# Patient Record
Sex: MALE | Race: OTHER | HISPANIC OR LATINO | URBAN - METROPOLITAN AREA
[De-identification: names, ages, dates, MRNs, and addresses within clinical notes are randomized per-mention and may not be internally consistent; named-entity substitution may affect disease eponyms.]

---

## 2021-08-29 ENCOUNTER — EMERGENCY (EMERGENCY)
Facility: HOSPITAL | Age: 41
LOS: 1 days | Discharge: HOME | End: 2021-08-29
Attending: EMERGENCY MEDICINE
Payer: COMMERCIAL

## 2021-08-29 VITALS
SYSTOLIC BLOOD PRESSURE: 145 MMHG | DIASTOLIC BLOOD PRESSURE: 77 MMHG | OXYGEN SATURATION: 98 % | WEIGHT: 149.91 LBS | TEMPERATURE: 98 F | HEART RATE: 75 BPM | RESPIRATION RATE: 16 BRPM | HEIGHT: 64 IN

## 2021-08-29 PROCEDURE — 99284 EMERGENCY DEPT VISIT MOD MDM: CPT

## 2021-08-29 PROCEDURE — 73080 X-RAY EXAM OF ELBOW: CPT | Mod: 26,RT

## 2021-08-29 PROCEDURE — 73080 X-RAY EXAM OF ELBOW: CPT

## 2021-08-29 PROCEDURE — 73564 X-RAY EXAM KNEE 4 OR MORE: CPT | Mod: 26,LT

## 2021-08-29 PROCEDURE — 99284 EMERGENCY DEPT VISIT MOD MDM: CPT | Mod: 25

## 2021-08-29 PROCEDURE — 73564 X-RAY EXAM KNEE 4 OR MORE: CPT

## 2021-08-29 PROCEDURE — 93971 EXTREMITY STUDY: CPT

## 2021-08-29 PROCEDURE — 93971 EXTREMITY STUDY: CPT | Mod: 26,LT

## 2021-08-29 RX ORDER — IBUPROFEN 200 MG
600 TABLET ORAL ONCE
Refills: 0 | Status: COMPLETED | OUTPATIENT
Start: 2021-08-29 | End: 2021-08-29

## 2021-08-29 RX ORDER — IBUPROFEN 200 MG
1 TABLET ORAL
Qty: 9 | Refills: 0
Start: 2021-08-29 | End: 2021-08-31

## 2021-08-29 RX ORDER — METHOCARBAMOL 500 MG/1
2 TABLET, FILM COATED ORAL
Qty: 18 | Refills: 0
Start: 2021-08-29 | End: 2021-08-31

## 2021-08-29 RX ORDER — DEXAMETHASONE 0.5 MG/5ML
10 ELIXIR ORAL ONCE
Refills: 0 | Status: COMPLETED | OUTPATIENT
Start: 2021-08-29 | End: 2021-08-29

## 2021-08-29 RX ADMIN — Medication 10 MILLIGRAM(S): at 21:45

## 2021-08-29 RX ADMIN — Medication 600 MILLIGRAM(S): at 20:28

## 2021-08-29 NOTE — ED ADULT TRIAGE NOTE - CHIEF COMPLAINT QUOTE
pt c/o left knee poping and pain walking with limp pt c/o right elbow pain for month without injury.

## 2021-08-29 NOTE — ED PROVIDER NOTE - NSFOLLOWUPINSTRUCTIONS_ED_ALL_ED_FT
- Please follow up with your Primary Care Doctor in 1 - 2 days. If you cannot follow-up with your primary care doctor please return to the Emergency Department for any urgent issues.  - Seek immediate medical care for any new, worsening or concerning signs or symptoms.   - Take medications as directed, be sure to read all instructions on packaging  - You were given copies of all your test results, please bring to your primary care doctor for review of any abnormal test results  - Follow up with Orthopedist or Follow up with Orthopedic [facility] Team by calling [phone] or emailing [email or web address] to make an appointment with an Orthopedist.   - If you have difficulty following up, please call: [phone] or go to [email or web address] to obtain a [facility] doctor or specialist who takes your insurance in your area.    Feel better!    Knee Pain    WHAT YOU NEED TO KNOW:    Knee pain may start suddenly, or it may be a long-term problem. You may have pain on the side, front, or back of your knee. You may have knee stiffness and swelling. You may hear popping sounds or feel like your knee is giving way or locking up as you walk. You may feel pain when you sit, stand, walk, or climb up and down stairs. Knee pain can be caused by conditions such as obesity, inflammation, or strains or tears in ligaments or tendons.     DISCHARGE INSTRUCTIONS:    Return to the emergency department if:   •Your pain is worse, even after treatment.       •You cannot bend or straighten your leg completely.       •The swelling around your knee does not go down even with treatment.      •Your knee is painful and hot to the touch.       Contact your healthcare provider if:   •You have questions or concerns about your condition or care.           Medicines: You may need any of the following:   •NSAIDs help decrease swelling and pain or fever. This medicine is available with or without a doctor's order. NSAIDs can cause stomach bleeding or kidney problems in certain people. If you take blood thinner medicine, always ask your healthcare provider if NSAIDs are safe for you. Always read the medicine label and follow directions.      •Acetaminophen decreases pain and fever. It is available without a doctor's order. Ask how much to take and how often to take it. Follow directions. Read the labels of all other medicines you are using to see if they also contain acetaminophen, or ask your doctor or pharmacist. Acetaminophen can cause liver damage if not taken correctly. Do not use more than 4 grams (4,000 milligrams) total of acetaminophen in one day.       •Prescription pain medicine may be given. Ask your healthcare provider how to take this medicine safely. Some prescription pain medicines contain acetaminophen. Do not take other medicines that contain acetaminophen without talking to your healthcare provider. Too much acetaminophen may cause liver damage. Prescription pain medicine may cause constipation. Ask your healthcare provider how to prevent or treat constipation.       •Take your medicine as directed. Contact your healthcare provider if you think your medicine is not helping or if you have side effects. Tell him or her if you are allergic to any medicine. Keep a list of the medicines, vitamins, and herbs you take. Include the amounts, and when and why you take them. Bring the list or the pill bottles to follow-up visits. Carry your medicine list with you in case of an emergency.      What you can do to manage your symptoms:   •Rest your knee so it can heal. Limit activities that increase your pain. Do low-impact exercises, such as walking or swimming.       •Apply ice to help reduce swelling and pain. Use an ice pack, or put crushed ice in a plastic bag. Cover it with a towel before you apply it to your knee. Apply ice for 15 to 20 minutes every hour, or as directed.      •Apply compression to help reduce swelling. Use a brace or bandage only as directed.      •Elevate your knee to help decrease pain and swelling. Elevate your knee while you are sitting or lying down. Prop your leg on pillows to keep your knee above the level of your heart.      •Prevent your knee from moving as directed. Your healthcare provider may put on a cast or splint. You may need to wear a leg brace to stabilize your knee. A leg brace can be adjusted to increase your range of motion as your knee heals.  Hinged Knee Braces            What you can do to prevent knee pain:   •Maintain a healthy weight. Extra weight increases your risk for knee pain. Ask your healthcare provider how much you should weigh. He or she can help you create a safe weight loss plan if you need to lose weight.      •Exercise or train properly. Use the correct equipment for sports. Wear shoes that provide good support. Check your posture often as you exercise, play sports, or train for an event. This can help prevent stress and strain on your knees. Rest between sessions so you do not overwork your knees.      Follow up with your healthcare provider within 24 hours or as directed: You may need follow-up treatments, such as steroid injections to decrease pain. Write down your questions so you remember to ask them during your visits.  ---  Elbow Pain    What are the signs or symptoms?    •Pain and tenderness in your forearm and the outer part of your elbow. You may have pain all the time or only when you use your arm.      •A burning feeling. This starts in your elbow and spreads down your arm.      •A weak  in your hand.        How is this treated?  Resting and icing your arm is often the first treatment. Your doctor may also recommend:  •Medicines to reduce pain and swelling.      •An elbow strap.      •Physical therapy. This may include massage or exercises or both.      •An elbow brace.      If these do not help your symptoms get better, your doctor may recommend surgery.      Follow these instructions at home:    If you have a brace or strap:     •Wear the brace or strap as told by your doctor. Take it off only as told by your doctor.      •Check the skin around the brace or strap every day. Tell your doctor if you see problems.    •Loosen it if your fingers:  •Tingle.      •Become numb.      •Turn cold and blue.        •Keep the brace or strap clean.    •If the brace or strap is not waterproof:  •Do not let it get wet.      •Cover it with a watertight covering when you take a bath or a shower.          Managing pain, stiffness, and swelling    •If told, put ice on the injured area. To do this:  •If you have a removable brace or strap, take it off as told by your doctor.      •Put ice in a plastic bag.      •Place a towel between your skin and the bag.      •Leave the ice on for 20 minutes, 2–3 times a day.      •Take off the ice if your skin turns bright red. This is very important. If you cannot feel pain, heat, or cold, you have a greater risk of damage to the area.        •Move your fingers often.      Activity     •Rest your elbow and wrist. Avoid activities that can cause elbow problems as told by your doctor.      •Do exercises as told by your doctor.      •If you lift an object, lift it with your palm facing up.      Lifestyle   •If your tennis elbow is caused by sports, check your equipment and make sure that:  •You are using it the right way.      •It fits you well.        •If your tennis elbow is caused by work or computer use, take breaks often to stretch your arm. Talk with your manager about how you can make your condition better at work.      General instructions     •Take over-the-counter and prescription medicines only as told by your doctor.      • Do not smoke or use any products that contain nicotine or tobacco. If you need help quitting, ask your doctor.      •Keep all follow-up visits.        How is this prevented?  •Before and after being active:  •Warm up and stretch before being active.      •Cool down and stretch after being active.      •Give your body time to rest between activities.      •While being active:  •Make sure to use equipment that fits you.      •If you play tennis, put power in your stroke with your lower body. Avoid using your arm only.      •Maintain physical fitness. This includes:  •Strength.      •Flexibility.      •Endurance.        •Do exercises to strengthen the forearm muscles.        Contact a doctor if:    •Your pain does not get better with treatment.      •Your pain gets worse.      •You have weakness in your forearm, hand, or fingers.      •You cannot feel your forearm, hand, or fingers.        Get help right away if:    •Your pain is very bad.      •You cannot move your wrist.        Summary    •Tennis elbow is irritation and swelling (inflammation) in your outer forearm, near your elbow.      •Tennis elbow is caused by doing the same motion over and over.      •Rest your elbow and wrist. Avoid activities as told by your doctor.      •If told, put ice on the injured area for 20 minutes, 2–3 times a day.      This information is not intended to replace advice given to you by your health care provider. Make sure you discuss any questions you have with your health care provider. - Please follow up with your Primary Care Doctor in 1 - 2 days. If you cannot follow-up with your primary care doctor please return to the Emergency Department for any urgent issues.  - Seek immediate medical care for any new, worsening or concerning signs or symptoms.   - Take medications as directed, be sure to read all instructions on packaging  - You were given copies of all your test results, please bring to your primary care doctor for review of any abnormal test results  - Follow up with Orthopedist or Follow up with Orthopedic [facility] Team by calling [phone] or emailing [email or web address] to make an appointment with an Orthopedist.   - If you have difficulty following up, please call: [phone] or go to [email or web address] to obtain a [facility] doctor or specialist who takes your insurance in your area.  - Your blood pressure reading was high while in ED, please monitor your blood pressure at home and follow up with PMD.     Feel better!    Knee Pain    WHAT YOU NEED TO KNOW:    Knee pain may start suddenly, or it may be a long-term problem. You may have pain on the side, front, or back of your knee. You may have knee stiffness and swelling. You may hear popping sounds or feel like your knee is giving way or locking up as you walk. You may feel pain when you sit, stand, walk, or climb up and down stairs. Knee pain can be caused by conditions such as obesity, inflammation, or strains or tears in ligaments or tendons.     DISCHARGE INSTRUCTIONS:    Return to the emergency department if:   •Your pain is worse, even after treatment.       •You cannot bend or straighten your leg completely.       •The swelling around your knee does not go down even with treatment.      •Your knee is painful and hot to the touch.       Contact your healthcare provider if:   •You have questions or concerns about your condition or care.           Medicines: You may need any of the following:   •NSAIDs help decrease swelling and pain or fever. This medicine is available with or without a doctor's order. NSAIDs can cause stomach bleeding or kidney problems in certain people. If you take blood thinner medicine, always ask your healthcare provider if NSAIDs are safe for you. Always read the medicine label and follow directions.      •Acetaminophen decreases pain and fever. It is available without a doctor's order. Ask how much to take and how often to take it. Follow directions. Read the labels of all other medicines you are using to see if they also contain acetaminophen, or ask your doctor or pharmacist. Acetaminophen can cause liver damage if not taken correctly. Do not use more than 4 grams (4,000 milligrams) total of acetaminophen in one day.       •Prescription pain medicine may be given. Ask your healthcare provider how to take this medicine safely. Some prescription pain medicines contain acetaminophen. Do not take other medicines that contain acetaminophen without talking to your healthcare provider. Too much acetaminophen may cause liver damage. Prescription pain medicine may cause constipation. Ask your healthcare provider how to prevent or treat constipation.       •Take your medicine as directed. Contact your healthcare provider if you think your medicine is not helping or if you have side effects. Tell him or her if you are allergic to any medicine. Keep a list of the medicines, vitamins, and herbs you take. Include the amounts, and when and why you take them. Bring the list or the pill bottles to follow-up visits. Carry your medicine list with you in case of an emergency.      What you can do to manage your symptoms:   •Rest your knee so it can heal. Limit activities that increase your pain. Do low-impact exercises, such as walking or swimming.       •Apply ice to help reduce swelling and pain. Use an ice pack, or put crushed ice in a plastic bag. Cover it with a towel before you apply it to your knee. Apply ice for 15 to 20 minutes every hour, or as directed.      •Apply compression to help reduce swelling. Use a brace or bandage only as directed.      •Elevate your knee to help decrease pain and swelling. Elevate your knee while you are sitting or lying down. Prop your leg on pillows to keep your knee above the level of your heart.      •Prevent your knee from moving as directed. Your healthcare provider may put on a cast or splint. You may need to wear a leg brace to stabilize your knee. A leg brace can be adjusted to increase your range of motion as your knee heals.  Hinged Knee Braces            What you can do to prevent knee pain:   •Maintain a healthy weight. Extra weight increases your risk for knee pain. Ask your healthcare provider how much you should weigh. He or she can help you create a safe weight loss plan if you need to lose weight.      •Exercise or train properly. Use the correct equipment for sports. Wear shoes that provide good support. Check your posture often as you exercise, play sports, or train for an event. This can help prevent stress and strain on your knees. Rest between sessions so you do not overwork your knees.      Follow up with your healthcare provider within 24 hours or as directed: You may need follow-up treatments, such as steroid injections to decrease pain. Write down your questions so you remember to ask them during your visits.  ---  Elbow Pain    What are the signs or symptoms?    •Pain and tenderness in your forearm and the outer part of your elbow. You may have pain all the time or only when you use your arm.      •A burning feeling. This starts in your elbow and spreads down your arm.      •A weak  in your hand.        How is this treated?  Resting and icing your arm is often the first treatment. Your doctor may also recommend:  •Medicines to reduce pain and swelling.      •An elbow strap.      •Physical therapy. This may include massage or exercises or both.      •An elbow brace.      If these do not help your symptoms get better, your doctor may recommend surgery.      Follow these instructions at home:    If you have a brace or strap:     •Wear the brace or strap as told by your doctor. Take it off only as told by your doctor.      •Check the skin around the brace or strap every day. Tell your doctor if you see problems.    •Loosen it if your fingers:  •Tingle.      •Become numb.      •Turn cold and blue.        •Keep the brace or strap clean.    •If the brace or strap is not waterproof:  •Do not let it get wet.      •Cover it with a watertight covering when you take a bath or a shower.          Managing pain, stiffness, and swelling    •If told, put ice on the injured area. To do this:  •If you have a removable brace or strap, take it off as told by your doctor.      •Put ice in a plastic bag.      •Place a towel between your skin and the bag.      •Leave the ice on for 20 minutes, 2–3 times a day.      •Take off the ice if your skin turns bright red. This is very important. If you cannot feel pain, heat, or cold, you have a greater risk of damage to the area.        •Move your fingers often.      Activity     •Rest your elbow and wrist. Avoid activities that can cause elbow problems as told by your doctor.      •Do exercises as told by your doctor.      •If you lift an object, lift it with your palm facing up.      Lifestyle   •If your tennis elbow is caused by sports, check your equipment and make sure that:  •You are using it the right way.      •It fits you well.        •If your tennis elbow is caused by work or computer use, take breaks often to stretch your arm. Talk with your manager about how you can make your condition better at work.      General instructions     •Take over-the-counter and prescription medicines only as told by your doctor.      • Do not smoke or use any products that contain nicotine or tobacco. If you need help quitting, ask your doctor.      •Keep all follow-up visits.        How is this prevented?  •Before and after being active:  •Warm up and stretch before being active.      •Cool down and stretch after being active.      •Give your body time to rest between activities.      •While being active:  •Make sure to use equipment that fits you.      •If you play tennis, put power in your stroke with your lower body. Avoid using your arm only.      •Maintain physical fitness. This includes:  •Strength.      •Flexibility.      •Endurance.        •Do exercises to strengthen the forearm muscles.        Contact a doctor if:    •Your pain does not get better with treatment.      •Your pain gets worse.      •You have weakness in your forearm, hand, or fingers.      •You cannot feel your forearm, hand, or fingers.        Get help right away if:    •Your pain is very bad.      •You cannot move your wrist.        Summary    •Tennis elbow is irritation and swelling (inflammation) in your outer forearm, near your elbow.      •Tennis elbow is caused by doing the same motion over and over.      •Rest your elbow and wrist. Avoid activities as told by your doctor.      •If told, put ice on the injured area for 20 minutes, 2–3 times a day.      This information is not intended to replace advice given to you by your health care provider. Make sure you discuss any questions you have with your health care provider.

## 2021-08-29 NOTE — ED PROVIDER NOTE - CARE PROVIDER_API CALL
Lg Rodrigez (DO)  Orthopedics  [address] [place] [id number]  Phone: [phone]  Fax: [phone]  Follow Up Time: 4-6 Days

## 2021-08-29 NOTE — ED PROVIDER NOTE - PROGRESS NOTE DETAILS
TONY Vail NOTE: Xray reviewed no fx noted; US duplex normal. Pt with some improvement, f/u outpatient orthopedist. Discussion includes results, plan, proper medication use/side effects, and return precautions. Pt advised to f/u with PMD 1-2 days and specialists discussed.  Printed copies of available lab/radiology results contained within discharge packet. Pt verbalized understanding/agreement of plan.

## 2021-08-29 NOTE — ED PROVIDER NOTE - PHYSICAL EXAMINATION
Vital signs noted, see flowsheet.  General: NAD, well appearing and non-toxic.  HEENT: NC/AT. MMM. Conjunctiva and sclera clear b/l.  EOMI. PERRL.  Neck: Soft and supple, full ROM without pain.  Cardiac: RRR. +S1/S2. Peripheral pulses 2+ and symmetric b/l. No LE edema.  Respiratory: Speaking in full sentences, CTA  Abdomen: NTND  Back: Spine midline and non-tender. No CVAT.  Skin: Normal color for race, no evidence of rash, ecchymosis, cyanosis or jaundice.   Neuro: Awake, alert and oriented to person/place/time/situation. Moves all extremities spontaneously and symmetrically.  No focal deficit  Psych: Normal affect     LLE: no gross deformity of extremity, FROM active and passive, small suprapatellar effusion, no tenderness over patella, No gross ligamentous instability. No tenderness to hip or ankle. Able to straight leg raise; NVI   RUE: Tenderness over medial epicondyle, + FROM; no tenderness over shoulder or wrist; NVI,  strong

## 2021-08-29 NOTE — ED PROVIDER NOTE - CLINICAL SUMMARY MEDICAL DECISION MAKING FREE TEXT BOX
41 y/o M with no PMHx presents to ED c/o left knee pain and "popping" sensation over past several days. Pt also reports right elbow pain for 1 month but denies any injury. Pt works as a  and a . Pt right hand dominant  -Will provide motrin, steroid; check Xray and ultrasound, follow up with orthopedist outpatient

## 2021-08-29 NOTE — ED PROVIDER NOTE - ATTENDING CONTRIBUTION TO CARE
The patient seen and examined    L knee pain  Elbow pain    I, Franky Lu, performed the initial face to face bedside interview with this patient regarding history of present illness, review of symptoms and relevant past medical, social and family history.  I completed an independent physical examination.  I was the initial provider who evaluated this patient. I have signed out the follow up of any pending tests (i.e. labs, radiological studies) to the ACP.  I have communicated the patient’s plan of care and disposition with the ACP.

## 2021-08-29 NOTE — ED PROVIDER NOTE - CARE PLAN
1 Principal Discharge DX:	Left knee pain  Secondary Diagnosis:	Right elbow pain  Secondary Diagnosis:	Elevated blood pressure reading

## 2021-08-29 NOTE — ED PROVIDER NOTE - OBJECTIVE STATEMENT
39 y/o M with no PMHx presents to ED c/o left knee pain and "popping" sensation over past several days. Pt also reports right elbow pain for 1 month but denies any injury. Pt works as a  and a . Pt right hand dominant. Pt did not take any analgesics for pain. Denies direct trauma or fall.

## 2021-08-29 NOTE — ED PROVIDER NOTE - PATIENT PORTAL LINK FT
You can access the FollowMyHealth Patient Portal offered by [facility] by registering at the following website: [email or web address]. By joining [facility]’s FollowMyHealth portal, you will also be able to view your health information using other applications (apps) compatible with our system.

## 2021-09-01 NOTE — CHART NOTE - NSCHARTNOTEFT_GEN_A_CORE
Outpatient follow up scheduled with Dr. Cantu on Wednesday 9/8/21 at 12:30pm  Orthopaedics  [address] [place] [id number]  Phone: [phone] Outpatient follow up scheduled with Dr. Cantu on Wednesday 9/8/21 at 12:30pm  Orthopaedics  [address] [place] [address]  Phone: [phone]    Patient requested new PCP as he does not have one. Follow up scheduled with  Dr. Pham on Friday 9/3/21 at 3:15pm  [address] [address]  Phone: [phone]

## 2021-09-02 PROBLEM — Z00.00 ENCOUNTER FOR PREVENTIVE HEALTH EXAMINATION: Status: ACTIVE | Noted: 2021-09-02

## 2021-09-03 ENCOUNTER — APPOINTMENT (OUTPATIENT)
Dept: FAMILY MEDICINE | Facility: CLINIC | Age: 41
End: 2021-09-03

## 2021-09-08 ENCOUNTER — APPOINTMENT (OUTPATIENT)
Dept: ORTHOPEDIC SURGERY | Facility: CLINIC | Age: 41
End: 2021-09-08

## 2022-09-11 NOTE — ED POST DISCHARGE NOTE - CERTIFIED LETTER SENT
Problem: Pain Acute  Goal: Acceptable Pain Control and Functional Ability  Outcome: Ongoing, Progressing  Intervention: Develop Pain Management Plan  Recent Flowsheet Documentation  Taken 9/10/2022 2124 by Nicolle Wooten RN  Pain Management Interventions: medication (see MAR)  Intervention: Prevent or Manage Pain  Recent Flowsheet Documentation  Taken 9/10/2022 2114 by Nicolle Wooten RN  Medication Review/Management: medications reviewed  Taken 9/10/2022 1600 by Nicolle Wooten RN  Medication Review/Management: medications reviewed     Problem: Cardiac Output Decreased (Pulmonary Hypertension)  Goal: Effective Cardiac Output  Outcome: Ongoing, Progressing   Goal Outcome Evaluation:      Pt is alert and oriented x 4, c/o pain in the abdomen, rated 5/10, Tylenol 1000 mg given, pain came down to 3/10. HR in the 60's, NSR. BP was 156/74, Hydralazine 10 mg given, after an hour, BP came down to 115/58.Lung sounds clear, diminished, no SOB reported. She is independent in the room, using a cane. Will continue to monitor.                 Yes
